# Patient Record
Sex: FEMALE | Race: ASIAN | NOT HISPANIC OR LATINO | URBAN - METROPOLITAN AREA
[De-identification: names, ages, dates, MRNs, and addresses within clinical notes are randomized per-mention and may not be internally consistent; named-entity substitution may affect disease eponyms.]

---
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
## 2022-02-16 NOTE — LETTER
(Inserted Image. Unable to display)       February 28, 2019      NIDA DINH  [address]  [place] [address] [id number]          Dear NIDA,      Thank you for selecting [facility] for your healthcare needs.     Our records indicate you are due for the following services:      Immunizations: HPV #3.    To schedule an appointment or if you have further questions, please contact your primary clinic:   [facility]       [phone]   [facility]       [phone]              [facility]     [phone]    Powered by [employer]    Sincerely,    Bharat Bobo MD